# Patient Record
Sex: FEMALE | Race: WHITE | NOT HISPANIC OR LATINO | Employment: OTHER
[De-identification: names, ages, dates, MRNs, and addresses within clinical notes are randomized per-mention and may not be internally consistent; named-entity substitution may affect disease eponyms.]

---

## 2023-09-07 ENCOUNTER — TRANSCRIBE ORDERS (OUTPATIENT)
Dept: OTHER | Age: 63
End: 2023-09-07

## 2023-09-07 DIAGNOSIS — S42.291D CLOSED 3-PART FRACTURE OF PROXIMAL END OF RIGHT HUMERUS WITH ROUTINE HEALING: Primary | ICD-10-CM

## 2023-09-24 ENCOUNTER — HEALTH MAINTENANCE LETTER (OUTPATIENT)
Age: 63
End: 2023-09-24

## 2023-09-25 ENCOUNTER — THERAPY VISIT (OUTPATIENT)
Dept: PHYSICAL THERAPY | Facility: CLINIC | Age: 63
End: 2023-09-25
Payer: COMMERCIAL

## 2023-09-25 DIAGNOSIS — M25.511 ACUTE PAIN OF RIGHT SHOULDER: Primary | ICD-10-CM

## 2023-09-25 DIAGNOSIS — S42.201A CLOSED FRACTURE OF PROXIMAL END OF RIGHT HUMERUS: ICD-10-CM

## 2023-09-25 PROCEDURE — 97161 PT EVAL LOW COMPLEX 20 MIN: CPT | Mod: GP | Performed by: PHYSICAL THERAPIST

## 2023-09-25 PROCEDURE — 97110 THERAPEUTIC EXERCISES: CPT | Mod: GP | Performed by: PHYSICAL THERAPIST

## 2023-09-25 NOTE — PROGRESS NOTES
[facility]                                                                                   OUTPATIENT PHYSICAL THERAPY    PLAN OF TREATMENT FOR OUTPATIENT REHABILITATION   Patient's Last Name, First Name, Naya Segovia YOB: 1960   Provider's Name   [facility]   Medical Record No.  [id number]     Onset Date: 08/02/23  Start of Care Date: (P) 09/25/23     Medical Diagnosis:  R shoulder 3-part proximal humerus fracture      PT Treatment Diagnosis:  R shoulder pain Plan of Treatment  Frequency/Duration: 1x/wk for 2 weeks then every other week for 8 weeks/ 10 weeks    Certification date from (P) 09/25/23 to (P) 12/04/23         See note for plan of treatment details and functional goals        09/25/23 0500   Appointment Info   Signing clinician's name / credentials Sheila Holloway DPT, SCS   Total/Authorized Visits 4 ([email or web address])   Visits Used 1   Medical Diagnosis R shoulder 3-part proximal humerus fracture   PT Tx Diagnosis R shoulder pain   Quick Adds Certification   Progress Note/Certification   Start of Care Date 09/25/23   Onset of illness/injury or Date of Surgery 08/02/23   Therapy Frequency 1x/wk for 2 weeks then every other week for 8 weeks   Predicted Duration 10 weeks   Certification date from 09/25/23   Certification date to 12/04/23   Progress Note Completed Date 11/20/23   PT Goal 1   Goal Identifier reaching   Goal Description improve AROM and strength to tolerated reaching with 2-3# to top shelf of OH cabinet pain free   Rationale to maximize safety and independence with performance of ADLs and functional tasks;to maximize safety and independence within the home;to maximize safety and independence within the community   Target Date 12/04/23   Treatment Interventions (PT)   Interventions Therapeutic Procedure/Exercise   Therapeutic Procedure/Exercise   Therapeutic Procedures: strength, endurance, ROM, flexibillity  minutes (07112) 15   Patient Response/Progress madison well, no increase in pain   Eval/Assessments   PT Eval, Low Complexity Minutes (47958) 20   Plan   Home program see ptrx   Plan for next session add AAROM as able based on MD evaluation next week   Total Session Time   Timed Code Treatment Minutes 15   Total Treatment Time (sum of timed and untimed services) 35         Sheila Holloway, DPT                         I CERTIFY THE NEED FOR THESE SERVICES FURNISHED UNDER        THIS PLAN OF TREATMENT AND WHILE UNDER MY CARE .             Physician Signature               Date    X_____________________________________________________                    Referring Provider:  Shelia Hewitt      Initial Assessment  See Epic Evaluation- Start of Care Date: (P) 09/25/23

## 2023-09-25 NOTE — PROGRESS NOTES
PHYSICAL THERAPY EVALUATION  Type of Visit: Evaluation    See electronic medical record for Abuse and Falls Screening details.    Subjective       Presenting condition or subjective complaint: pt was playing pickle ball when she tripped and landed on her shoulder and suffered a 3-part proximal humerus fracture on 23. She is almost 8 weeks since her injury and x-rays continue to show good, routine healing.  Date of onset:      Relevant medical history: Migraines or headaches; Vision problems   Dates & types of surgery:   & , laprascopic partial hysterectomy     Prior diagnostic imaging/testing results: X-ray     Prior therapy history for the same diagnosis, illness or injury: No          Living Environment  Social support: With a significant other or spouse   Type of home: House   Stairs to enter the home: Yes 5 Is there a railing: Yes   Ramp: No   Stairs inside the home: Yes 20 Is there a railing: Yes   Help at home: None  Equipment owned:       Employment: No retired  Hobbies/Interests: [employer], stamping, golfing, pickleball, biking, walking, reading, puzzles, boating, gardening, baking    Patient goals for therapy: freely move my arm without causing further injury while healing    Pain assessment: See objective evaluation for additional pain details     Objective   Pain:   Location: anterior and into biceps like in the armpit  At rest: 0/10  With activity: 2/10  Quality: dull ache, sharp,   Frequency: Intermittent  Time of Day: worse in pm  Pain is exacerbated by: reaching any direction, behind back, lying on shoulder and cannot do hair  Pain is relieved by: rest and sling    Range of Motion:  AAROM R shoulder: table slide flexion=45, ER=30    AROM L shoulder: 155/160/T7/80    Strength:  L shoulder: 5/5 all directions    R shoulder: not tested  Notable mechanics: not tested  Special Tests: not tested  Palpation: Tension and hypertonicity noted at R UT, LS, rhomboids  Posture: good  seated posture noted      Assessment & Plan   CLINICAL IMPRESSIONS  Medical Diagnosis:      Treatment Diagnosis:     Impression/Assessment: Patient is a 63 year old female with R shoulder complaints.  The following significant findings have been identified: Pain, Decreased ROM/flexibility, Decreased joint mobility, and Decreased strength. These impairments interfere with their ability to perform self care tasks, recreational activities, household chores, and driving  as compared to previous level of function.     Clinical Decision Making (Complexity):  Clinical Presentation: Stable/Uncomplicated  Clinical Presentation Rationale: based on medical and personal factors listed in PT evaluation  Clinical Decision Making (Complexity): Low complexity    PLAN OF CARE  Treatment Interventions:  Interventions: Manual Therapy, Neuromuscular Re-education, Therapeutic Activity, Therapeutic Exercise    Long Term Goals            Frequency of Treatment:    Duration of Treatment:      Recommended Referrals to Other Professionals: Physical Therapy  Education Assessment:        Risks and benefits of evaluation/treatment have been explained.   Patient/Family/caregiver agrees with Plan of Care.     Evaluation Time:             Signing Clinician: Sheila Phillip PT      [facility]                                                                                   OUTPATIENT PHYSICAL THERAPY      PLAN OF TREATMENT FOR OUTPATIENT REHABILITATION   Patient's Last Name, First Name, Naya Segovia YOB: 1960   Provider's Name   [facility]   Medical Record No.  [id number]     Onset Date:    Start of Care Date:       Medical Diagnosis:         PT Treatment Diagnosis:    Plan of Treatment  Frequency/Duration:  /      Certification date from   to           See note for plan of treatment details and functional goals     AARON Little  CERTIFY THE NEED FOR THESE SERVICES FURNISHED UNDER        THIS PLAN OF TREATMENT AND WHILE UNDER MY CARE .             Physician Signature               Date    X_____________________________________________________                    Referring Provider:  Shelia Hewitt      Initial Assessment  See Epic Evaluation-

## 2023-10-02 ENCOUNTER — THERAPY VISIT (OUTPATIENT)
Dept: PHYSICAL THERAPY | Facility: CLINIC | Age: 63
End: 2023-10-02
Payer: COMMERCIAL

## 2023-10-02 DIAGNOSIS — S42.201A CLOSED FRACTURE OF PROXIMAL END OF RIGHT HUMERUS: Primary | ICD-10-CM

## 2023-10-02 PROCEDURE — 97112 NEUROMUSCULAR REEDUCATION: CPT | Mod: GP | Performed by: PHYSICAL THERAPIST

## 2023-10-02 PROCEDURE — 97110 THERAPEUTIC EXERCISES: CPT | Mod: GP | Performed by: PHYSICAL THERAPIST

## 2023-10-09 ENCOUNTER — THERAPY VISIT (OUTPATIENT)
Dept: PHYSICAL THERAPY | Facility: CLINIC | Age: 63
End: 2023-10-09
Payer: COMMERCIAL

## 2023-10-09 DIAGNOSIS — S42.201A CLOSED FRACTURE OF PROXIMAL END OF RIGHT HUMERUS: Primary | ICD-10-CM

## 2023-10-09 PROCEDURE — 97112 NEUROMUSCULAR REEDUCATION: CPT | Mod: GP | Performed by: PHYSICAL THERAPIST

## 2023-10-09 PROCEDURE — 97110 THERAPEUTIC EXERCISES: CPT | Mod: GP | Performed by: PHYSICAL THERAPIST

## 2023-10-18 ENCOUNTER — THERAPY VISIT (OUTPATIENT)
Dept: PHYSICAL THERAPY | Facility: CLINIC | Age: 63
End: 2023-10-18
Payer: COMMERCIAL

## 2023-10-18 DIAGNOSIS — S42.201A CLOSED FRACTURE OF PROXIMAL END OF RIGHT HUMERUS: Primary | ICD-10-CM

## 2023-10-18 PROCEDURE — 97112 NEUROMUSCULAR REEDUCATION: CPT | Mod: GP | Performed by: PHYSICAL THERAPIST

## 2023-10-18 PROCEDURE — 97140 MANUAL THERAPY 1/> REGIONS: CPT | Mod: GP | Performed by: PHYSICAL THERAPIST

## 2023-10-18 PROCEDURE — 97110 THERAPEUTIC EXERCISES: CPT | Mod: GP | Performed by: PHYSICAL THERAPIST

## 2023-10-27 ENCOUNTER — THERAPY VISIT (OUTPATIENT)
Dept: PHYSICAL THERAPY | Facility: CLINIC | Age: 63
End: 2023-10-27
Payer: COMMERCIAL

## 2023-10-27 DIAGNOSIS — S42.201A CLOSED FRACTURE OF PROXIMAL END OF RIGHT HUMERUS: Primary | ICD-10-CM

## 2023-10-27 PROCEDURE — 97140 MANUAL THERAPY 1/> REGIONS: CPT | Mod: GP | Performed by: PHYSICAL THERAPIST

## 2023-10-27 PROCEDURE — 97110 THERAPEUTIC EXERCISES: CPT | Mod: GP | Performed by: PHYSICAL THERAPIST

## 2023-10-27 PROCEDURE — 97112 NEUROMUSCULAR REEDUCATION: CPT | Mod: GP | Performed by: PHYSICAL THERAPIST

## 2023-11-10 ENCOUNTER — THERAPY VISIT (OUTPATIENT)
Dept: PHYSICAL THERAPY | Facility: CLINIC | Age: 63
End: 2023-11-10
Payer: COMMERCIAL

## 2023-11-10 DIAGNOSIS — S42.201A CLOSED FRACTURE OF PROXIMAL END OF RIGHT HUMERUS: Primary | ICD-10-CM

## 2023-11-10 PROCEDURE — 97110 THERAPEUTIC EXERCISES: CPT | Mod: GP | Performed by: PHYSICAL THERAPIST

## 2023-11-10 PROCEDURE — 97112 NEUROMUSCULAR REEDUCATION: CPT | Mod: GP | Performed by: PHYSICAL THERAPIST

## 2023-11-22 ENCOUNTER — THERAPY VISIT (OUTPATIENT)
Dept: PHYSICAL THERAPY | Facility: CLINIC | Age: 63
End: 2023-11-22
Payer: COMMERCIAL

## 2023-11-22 DIAGNOSIS — S42.201A CLOSED FRACTURE OF PROXIMAL END OF RIGHT HUMERUS: Primary | ICD-10-CM

## 2023-11-22 PROCEDURE — 97112 NEUROMUSCULAR REEDUCATION: CPT | Mod: GP | Performed by: PHYSICAL THERAPIST

## 2023-11-22 PROCEDURE — 97110 THERAPEUTIC EXERCISES: CPT | Mod: GP | Performed by: PHYSICAL THERAPIST

## 2023-12-03 ENCOUNTER — HEALTH MAINTENANCE LETTER (OUTPATIENT)
Age: 63
End: 2023-12-03

## 2023-12-07 ENCOUNTER — THERAPY VISIT (OUTPATIENT)
Dept: PHYSICAL THERAPY | Facility: CLINIC | Age: 63
End: 2023-12-07
Payer: COMMERCIAL

## 2023-12-07 DIAGNOSIS — S42.201A CLOSED FRACTURE OF PROXIMAL END OF RIGHT HUMERUS: Primary | ICD-10-CM

## 2023-12-07 PROCEDURE — 97110 THERAPEUTIC EXERCISES: CPT | Mod: GP | Performed by: PHYSICAL THERAPIST

## 2023-12-07 PROCEDURE — 97112 NEUROMUSCULAR REEDUCATION: CPT | Mod: GP | Performed by: PHYSICAL THERAPIST

## 2023-12-08 NOTE — PROGRESS NOTES
[facility]                                                                                   OUTPATIENT PHYSICAL THERAPY    PLAN OF TREATMENT FOR OUTPATIENT REHABILITATION   Patient's Last Name, First Name, Naya Segovia YOB: 1960   Provider's Name   [facility]   Medical Record No.  [id number]     Onset Date: 08/02/23  Start of Care Date: 09/25/23     Medical Diagnosis:  R shoulder 3-part proximal humerus fracture      PT Treatment Diagnosis:  R shoulder pain Plan of Treatment  Frequency/Duration: 1x/wk every other week/ 8 weeks    Certification date from 12/05/23 to 01/30/24         See note for plan of treatment details and functional goals        12/07/23 0500   Appointment Info   Signing clinician's name / credentials Sheila Holloway DPT, SCS   Total/Authorized Visits 4 ([email or web address])   Visits Used 7   Medical Diagnosis R shoulder 3-part proximal humerus fracture   PT Tx Diagnosis R shoulder pain   Quick Adds Certification   Progress Note/Certification   Start of Care Date 09/25/23   Onset of illness/injury or Date of Surgery 08/02/23   Therapy Frequency 1x/wk every other week   Predicted Duration 8 weeks   Certification date from 12/05/23   Certification date to 01/30/24   Progress Note Completed Date 11/20/23   PT Goal 1   Goal Identifier reaching   Goal Description improve AROM and strength to tolerated reaching with 2-3# to top shelf of [place] cabinet pain free   Rationale to maximize safety and independence with performance of ADLs and functional tasks;to maximize safety and independence within the home;to maximize safety and independence within the community   Goal Progress AROM 140/145   Target Date 01/30/24  (goal extended as she can do it without weight but with weight she hikes shoulder)   Subjective Report   Subjective Report the hand is better for the most part but she still struggles with the hand going numb if she is  "holding something for a long time. She is no longer bothered at night with the hand. She has been able to add   Objective Measures   Objective Measures Objective Measure 1   Objective Measure 1   Objective Measure R shoulder AAROM: and tenderness/hypertonicity along R UT/LS   Details wall [id number]=135,  ABD=145 and ER table slide=67   Treatment Interventions (PT)   Interventions Therapeutic Procedure/Exercise;Neuromuscular Re-education;Manual Therapy   Therapeutic Procedure/Exercise   Therapeutic Procedures: strength, endurance, ROM, flexibillity minutes (99358) 25   PTRx Ther Proc 1 Cervical Retraction with Overpressure   PTRx Ther Proc 1 - Details 2 x 10 with PT OP and first diminished her sx from middle 2 fingers to her thumb then abolished all sx after second set   PTRx Ther Proc 2 Pendulum/Codmans   PTRx Ther Proc 2 - Details No Notes   PTRx Ther Proc 3 Wand Shoulder Flexion Supine   PTRx Ther Proc 3 - Details 15 with towel roll for neck   PTRx Ther Proc 4 Wall Slides Abduction   PTRx Ther Proc 4 - Details 15   PTRx Ther Proc 5 Four Corner Stretch Flexion   PTRx Ther Proc 5 - Details 15   PTRx Ther Proc 6 Supported Shoulder External Rotation   PTRx Ther Proc 6 - Details x15 with wand   PTRx Ther Proc 7 Finger Active Range of Motion Tendon Glides Fist Series   PTRx Ther Proc 7 - Details hep   PTRx Ther Proc 8 Shoulder Scaption Full Can   PTRx Ther Proc 8 - Details x15    Skilled Intervention cuing for form   Patient Response/Progress madison well, no increase in pain   Neuromuscular Re-education   Neuromuscular re-ed of mvmt, balance, coord, kinesthetic sense, posture, proprioception minutes (51620) 15   PTRx Neuro Re-ed 2 Seated Cervical Retraction Extension With Overpressure   PTRx Neuro Re-ed 2 - Details 10   PTRx Neuro Re-ed 3 Nerve Gliding Distal Ulnar    PTRx Neuro Re-ed 3 - Details x45\"   PTRx Neuro Re-ed 4 Median Nerve Mobility   PTRx Neuro Re-ed 4 - Details 15   PTRx Neuro Re-ed 5 Shoulder Extension in " Standing   PTRx Neuro Re-ed 5 - Details 15 NMR fo scap dep   PTRx Neuro Re-ed 6 Shoulder Flexion   PTRx Neuro Re-ed 6 - Details significant NMR for scap ret/dep x 15 with back on wall   PTRx Neuro Re-ed 7 Shoulder Horizontal Abduction Standing   PTRx Neuro Re-ed 7 - Details 15 with NMR for scap ret/dep   PTRx Neuro Re-ed 8 Push-Up Plus At Wall   PTRx Neuro Re-ed 8 - Details 15   Skilled Intervention cuing for form   Patient Response/Progress madison well, no increase in pain   Plan   Home program see ptrx   Plan for next session progress RC and scap stab as able   Total Session Time   Timed Code Treatment Minutes 40   Total Treatment Time (sum of timed and untimed services) 40       Sheila Phillip, PT                         I CERTIFY THE NEED FOR THESE SERVICES FURNISHED UNDER        THIS PLAN OF TREATMENT AND WHILE UNDER MY CARE .             Physician Signature               Date    X_____________________________________________________                  Referring Provider:  Shelia Hewitt    Initial Assessment  See Epic Evaluation- Start of Care Date: 09/25/23

## 2023-12-21 ENCOUNTER — THERAPY VISIT (OUTPATIENT)
Dept: PHYSICAL THERAPY | Facility: CLINIC | Age: 63
End: 2023-12-21
Payer: COMMERCIAL

## 2023-12-21 DIAGNOSIS — S42.201A CLOSED FRACTURE OF PROXIMAL END OF RIGHT HUMERUS: Primary | ICD-10-CM

## 2023-12-21 PROCEDURE — 97112 NEUROMUSCULAR REEDUCATION: CPT | Mod: GP | Performed by: PHYSICAL THERAPIST

## 2023-12-21 PROCEDURE — 97110 THERAPEUTIC EXERCISES: CPT | Mod: GP | Performed by: PHYSICAL THERAPIST

## 2023-12-21 NOTE — PROGRESS NOTES
12/21/23 0500   Appointment Info   Signing clinician's name / credentials Sheila Holloway DPT, SCS   Total/Authorized Visits 4 ([email or web address])   Visits Used 8   Medical Diagnosis R shoulder 3-part proximal humerus fracture   PT Tx Diagnosis R shoulder pain   Quick Adds Certification   Progress Note/Certification   Start of Care Date 09/25/23   Onset of illness/injury or Date of Surgery 08/02/23   Therapy Frequency 1x/wk every other week   Predicted Duration 8 weeks   Certification date from 12/05/23   Certification date to 01/30/24   Progress Note Completed Date 11/20/23   PT Goal 1   Goal Identifier reaching   Goal Description improve AROM and strength to tolerated reaching with 2-3# to top shelf of [place] cabinet pain free   Rationale to maximize safety and independence with performance of ADLs and functional tasks;to maximize safety and independence within the home;to maximize safety and independence within the community   Goal Progress AROM 145/158   Target Date 01/30/24  (goal extended as she can do it without weight but with weight she hikes shoulder)   Subjective Report   Subjective Report the hand is better for the most part but she still struggles with the hand going numb if she is holding something for a long time. She is no longer bothered at night with the hand. She has been able to add   Objective Measures   Objective Measures Objective Measure 1   Objective Measure 1   Objective Measure R shoulder AAROM: and tenderness/hypertonicity along R UT/LS   Details wall [id number]=[id number],  ABD=145 and ER table slide=67   Treatment Interventions (PT)   Interventions Therapeutic Procedure/Exercise;Neuromuscular Re-education;Manual Therapy   Therapeutic Procedure/Exercise   Therapeutic Procedures: strength, endurance, ROM, flexibillity minutes (65886) 23   PTRx Ther Proc 1 Cervical Retraction with Overpressure   PTRx Ther Proc 1 - Details 2 x 10 with PT OP and first diminished her sx from middle 2 fingers to her thumb then  abolished all sx after second set   PTRx Ther Proc 2 Pendulum/Codmans   PTRx Ther Proc 2 - Details 20   PTRx Ther Proc 3 Wall Slides Abduction   PTRx Ther Proc 3 - Details 15   PTRx Ther Proc 4 Four Corner Stretch Flexion   PTRx Ther Proc 4 - Details 15   PTRx Ther Proc 5 Supported Shoulder External Rotation   PTRx Ther Proc 5 - Details x15 with wand   PTRx Ther Proc 6 Finger Active Range of Motion Tendon Glides Fist Series   PTRx Ther Proc 6 - Details hep   PTRx Ther Proc 7 Shoulder Scaption Full Can   PTRx Ther Proc 7 - Details x15    PTRx Ther Proc 8 Shoulder External Rotation Sidelying   PTRx Ther Proc 8 - Details rev   Skilled Intervention cuing for form   Patient Response/Progress madison well, no increase in pain   PTRx Ther Proc 9 Ball Prone Scapula I   PTRx Ther Proc 9 - Details 10   Neuromuscular Re-education   Neuromuscular re-ed of mvmt, balance, coord, kinesthetic sense, posture, proprioception minutes (57127) 15   PTRx Neuro Re-ed 3 - Details rev HEP for nerve glides x 1'   PTRx Neuro Re-ed 5 Shoulder Extension in Standing   PTRx Neuro Re-ed 5 - Details 15 NMR fo scap dep   PTRx Neuro Re-ed 6 Shoulder Flexion   PTRx Neuro Re-ed 6 - Details significant NMR for scap ret/dep x 15 with back on wall   PTRx Neuro Re-ed 7 Shoulder Horizontal Abduction Standing   PTRx Neuro Re-ed 7 - Details 15 with NMR for scap ret/dep   PTRx Neuro Re-ed 8 educated on return to golf and pickleball x 8'   Skilled Intervention cuing for form   Patient Response/Progress madison well, no increase in pain   PTRx Neuro Re-ed 9 Ball Prone Scapula T   PTRx Neuro Re-ed 9 - Details 10   Plan   Home program see ptrx   Plan for next session dc to home         DISCHARGE  Reason for Discharge: Patient has met all goals.  No further expectation of progress.    Equipment Issued: n/a    Discharge Plan: Patient to continue home program.    Referring Provider:  Shelia Hewitt

## 2025-01-05 ENCOUNTER — HEALTH MAINTENANCE LETTER (OUTPATIENT)
Age: 65
End: 2025-01-05

## 2025-02-08 ENCOUNTER — HEALTH MAINTENANCE LETTER (OUTPATIENT)
Age: 65
End: 2025-02-08